# Patient Record
Sex: FEMALE | Race: OTHER | Employment: PART TIME | URBAN - METROPOLITAN AREA
[De-identification: names, ages, dates, MRNs, and addresses within clinical notes are randomized per-mention and may not be internally consistent; named-entity substitution may affect disease eponyms.]

---

## 2024-04-05 ENCOUNTER — APPOINTMENT (OUTPATIENT)
Dept: GENERAL RADIOLOGY | Age: 59
End: 2024-04-05
Attending: EMERGENCY MEDICINE
Payer: COMMERCIAL

## 2024-04-05 ENCOUNTER — HOSPITAL ENCOUNTER (OUTPATIENT)
Age: 59
Discharge: HOME | End: 2024-04-05
Attending: EMERGENCY MEDICINE
Payer: COMMERCIAL

## 2024-04-05 VITALS
HEIGHT: 61 IN | HEART RATE: 82 BPM | WEIGHT: 177 LBS | OXYGEN SATURATION: 95 % | RESPIRATION RATE: 20 BRPM | DIASTOLIC BLOOD PRESSURE: 87 MMHG | SYSTOLIC BLOOD PRESSURE: 135 MMHG | BODY MASS INDEX: 33.42 KG/M2 | TEMPERATURE: 98 F

## 2024-04-05 DIAGNOSIS — R05.2 SUBACUTE COUGH: Primary | ICD-10-CM

## 2024-04-05 LAB — SARS-COV-2 RNA RESP QL NAA+PROBE: NOT DETECTED

## 2024-04-05 PROCEDURE — 99203 OFFICE O/P NEW LOW 30 MIN: CPT

## 2024-04-05 PROCEDURE — 99204 OFFICE O/P NEW MOD 45 MIN: CPT

## 2024-04-05 PROCEDURE — 71046 X-RAY EXAM CHEST 2 VIEWS: CPT | Performed by: EMERGENCY MEDICINE

## 2024-04-05 RX ORDER — PREDNISONE 20 MG/1
60 TABLET ORAL DAILY
Qty: 15 TABLET | Refills: 0 | Status: SHIPPED | OUTPATIENT
Start: 2024-04-05 | End: 2024-04-10

## 2024-04-05 RX ORDER — ALBUTEROL SULFATE 90 UG/1
2 AEROSOL, METERED RESPIRATORY (INHALATION) EVERY 4 HOURS PRN
Qty: 1 EACH | Refills: 0 | Status: SHIPPED | OUTPATIENT
Start: 2024-04-05 | End: 2024-05-05

## 2024-04-05 RX ORDER — BENZONATATE 100 MG/1
100 CAPSULE ORAL 3 TIMES DAILY PRN
Qty: 30 CAPSULE | Refills: 0 | Status: SHIPPED | OUTPATIENT
Start: 2024-04-05 | End: 2024-05-05

## 2024-04-05 RX ORDER — AZITHROMYCIN 250 MG/1
TABLET, FILM COATED ORAL
Qty: 6 TABLET | Refills: 0 | Status: SHIPPED | OUTPATIENT
Start: 2024-04-05 | End: 2024-04-10

## 2024-04-05 RX ORDER — AMLODIPINE BESYLATE 10 MG/1
10 TABLET ORAL DAILY
COMMUNITY

## 2024-04-05 NOTE — ED INITIAL ASSESSMENT (HPI)
States she has a chronic cough but for 1 month, the cough is getting worse. Had a cold 1 week ago. Otc meds not helping. Denies fevers.

## 2024-04-05 NOTE — ED PROVIDER NOTES
Patient Seen in: [facility]      History     Chief Complaint   Patient presents with    Cough/URI     Stated Complaint: cough    Subjective:   HPI    Patient is a 59-year-old female with a history of a chronic cough.  Says that she has had multiple workups and seen pulmonology without clear etiology.  She presents with a cough that is worse over the last month.  Had congestion and runny nose a week ago.  The cough has been persistent not responding to NyQuil or over-the-counter cold medications.  Coughing up a small amount of sputum.  No chest pain.  No other specific complaints.  Denies history of asthma.  Patient is otherwise at her medical baseline.    Objective:   Past Medical History:   Diagnosis Date    Essential hypertension               History reviewed. No pertinent surgical history.             Social History     Socioeconomic History    Marital status:    Tobacco Use    Smoking status: Never     Passive exposure: Never   Vaping Use    Vaping Use: Never used              Review of Systems    Positive for stated complaint: cough  Other systems are as noted in HPI.  Constitutional and vital signs reviewed.      All other systems reviewed and negative except as noted above.    Physical Exam     ED Triage Vitals [04/05/24 0927]   /87   Pulse 82   Resp 20   Temp 98.3 °F (36.8 °C)   Temp src Temporal   SpO2 95 %   O2 Device None (Room air)       Current:/87   Pulse 82   Temp 98.3 °F (36.8 °C) (Temporal)   Resp 20   Ht 154.9 cm (5' 1\")   Wt 80.3 kg   SpO2 95%   BMI 33.44 kg/m²         Physical Exam    General: Patient is resting comfortably in no acute distress  HEENT: Normal cephalic atraumatic.  Nonicteric sclera.  Moist mucous membranes.  No meningismus.  No adenopathy  Lungs: No tachypnea.  Lungs clear to auscultation bilaterally without rales/rhonchi.  Equal breath sounds bilaterally  Cardiac: No tachycardia.  No murmurs.  Regular rate and rhythm.  Abdomen: Soft  and nontender throughout.  No rebound or guarding  Extremities: No clubbing/cyanosis/edema.  Skin: No rashes, no pallor  Neuro: Awake oriented ×3.  Nonfocal.  Good strength throughout    ED Course     Labs Reviewed   RAPID SARS-COV-2 BY PCR - Normal             Chest x-ray: I personally reviewed the films and my independent interpertaion showed normal.  No pneumonia.  Official report reviewed.  Normal.  No pneumonia.     COVID-negative    MDM      Patient is a 59-year-old woman with a history of a chronic cough with worsening symptoms over the last month particular over the last week.  Differential includes secondary pneumonia, cough.  Asthma, nasal drip, other.  Discussed with patient, will check chest x-ray.  Would consider course of steroids, albuterol to see if it helps.  Given her worsening symptoms over the last months we will treat for clinical pneumonia, bacterial bronchitis with a Z-Mickey.  Will follow-up with primary care.  Suggest pulmonology if not improving.                                   Medical Decision Making      Disposition and Plan     Clinical Impression:  1. Subacute cough         Disposition:  Discharge  4/5/2024 10:07 am    Follow-up:  Mirna Plata DO  [id number]  [facility] [address]  [facility] [id number] [phone]    In 1 week      Kate Ortiz MD  [address]  [facility] [address]  [facility] [id number] [phone]    In 1 week            Medications Prescribed:  Discharge Medication List as of 4/5/2024 10:08 AM        START taking these medications    Details   predniSONE 20 MG Oral Tab Take 3 tablets (60 mg total) by mouth daily for 5 days., Print, Disp-15 tablet, R-0      azithromycin (ZITHROMAX Z-MICKEY) 250 MG Oral Tab 500 mg once followed by 250 mg daily x 4 days, Print, Disp-6 tablet, R-0      albuterol 108 (90 Base) MCG/ACT Inhalation Aero Soln Inhale 2 puffs into the lungs every 4 (four) hours as needed for Wheezing., Print, Disp-1 each, R-0      benzonatate 100 MG Oral Cap Take 1 capsule  (100 mg total) by mouth 3 (three) times daily as needed for cough., Print, Disp-30 capsule, R-0

## 2024-04-05 NOTE — DISCHARGE INSTRUCTIONS
Will treat for possible bacterial bronchitis given the length your cough.  Will treat with Z-Mickey.  Prednisone, albuterol.  Can take Tessalon Perles.  Will follow-up with primary care.  Consider pulmonology.  Return if worsening symptoms or new complaints.

## 2024-05-07 ENCOUNTER — OFFICE VISIT (OUTPATIENT)
Dept: INTERNAL MEDICINE CLINIC | Facility: CLINIC | Age: 59
End: 2024-05-07
Payer: COMMERCIAL

## 2024-05-07 VITALS
OXYGEN SATURATION: 93 % | BODY MASS INDEX: 33 KG/M2 | SYSTOLIC BLOOD PRESSURE: 130 MMHG | RESPIRATION RATE: 15 BRPM | TEMPERATURE: 98 F | WEIGHT: 173.63 LBS | DIASTOLIC BLOOD PRESSURE: 80 MMHG | HEART RATE: 74 BPM

## 2024-05-07 DIAGNOSIS — I10 HYPERTENSION, UNSPECIFIED TYPE: ICD-10-CM

## 2024-05-07 DIAGNOSIS — Z01.89 ROUTINE LAB DRAW: ICD-10-CM

## 2024-05-07 DIAGNOSIS — R05.3 CHRONIC COUGH: ICD-10-CM

## 2024-05-07 DIAGNOSIS — Z12.11 COLON CANCER SCREENING: ICD-10-CM

## 2024-05-07 DIAGNOSIS — J98.9 CHRONIC AIRWAY DISEASE: Primary | ICD-10-CM

## 2024-05-07 PROCEDURE — 3075F SYST BP GE 130 - 139MM HG: CPT | Performed by: INTERNAL MEDICINE

## 2024-05-07 PROCEDURE — 99204 OFFICE O/P NEW MOD 45 MIN: CPT | Performed by: INTERNAL MEDICINE

## 2024-05-07 PROCEDURE — 3079F DIAST BP 80-89 MM HG: CPT | Performed by: INTERNAL MEDICINE

## 2024-05-07 RX ORDER — ALBUTEROL SULFATE 90 UG/1
2 AEROSOL, METERED RESPIRATORY (INHALATION) EVERY 4 HOURS PRN
Qty: 3 EACH | Refills: 1 | Status: SHIPPED | OUTPATIENT
Start: 2024-05-07

## 2024-05-07 RX ORDER — MONTELUKAST SODIUM 10 MG/1
10 TABLET ORAL NIGHTLY
Qty: 90 TABLET | Refills: 0 | Status: SHIPPED | OUTPATIENT
Start: 2024-05-07

## 2024-05-07 NOTE — H&P
Subjective:   Raiza Carranza is a 59 year old female  who presents for Cough       Went to  on 4/5/24 for chronic cough. Per review she has had work-up; including seeing pulm.  Had recent exacerbation so went to . She was treated to cover for PNA and also given steroid course.  She was also given referral for pulm.      Reports that she had PFTs that were neg in 2011 and last year. States she has had several.   However she states that albuterol helped in the past.   Does not recall CT chest but has had cxrs.   No history of smoking.   Has worked in factories.   Parents did smoke.   Pt states that since ~2004 she has had chronic cough that is worse with outside environments and cold.     Pt does reports allergies. States that OTC allergy  medication wo improvement. Has not tried montelukast.     HTN-controlled     States she had mammogram ~ 7 months at [facility].  Due for colon cancer screening. Denies family hx of colon cancer. Pt would rather do FIT  History/Other:    Chief Complaint Reviewed and Verified  No Further Nursing Notes to   Review  Tobacco Reviewed  Allergies Reviewed  Medications Reviewed    Medical History Reviewed  Surgical History Reviewed  OB Status Reviewed    Family History Reviewed  Social History Reviewed         Current Outpatient Medications   Medication Sig Dispense Refill    amLODIPine 10 MG Oral Tab Take 1 tablet (10 mg total) by mouth daily.         Review of Systems:  Pertinent items are noted in HPI.  A comprehensive 10 point review of systems was completed.  Pertinent positives and negatives noted in the the HPI.        Objective:   /80 (BP Location: Right arm, Patient Position: Sitting, Cuff Size: adult)   Pulse 74   Temp 98.3 °F (36.8 °C) (Temporal)   Resp 15   Wt 173 lb 9.6 oz (78.7 kg)   LMP  (LMP Unknown)   SpO2 93%   BMI 32.80 kg/m²  Estimated body mass index is 32.8 kg/m² as calculated from the following:    Height as of 4/5/24: 5' 1\" (1.549  Detail Level: Zone m).    Weight as of this encounter: 173 lb 9.6 oz (78.7 kg).  PHYSICAL EXAM:   General: no acute distress   Respiratory: no increased work of breathing; good air exchange; CTAB; no crackles or wheezing   Cardiovascular: RRR; S1, S2; no murmurs  Neurological: awake, alert, oriented x3; CNII-XII grossly intact;  Behavioral/Psych: euthymic; appropriate affect       Assessment & Plan:   Raiza was seen today for cough.    Diagnoses and all orders for this visit:  Chronic cough  Chronic airway disease  -     CT CHEST (CPT=71250); Future  -     albuterol 108 (90 Base) MCG/ACT Inhalation Aero Soln; Inhale 2 puffs into the lungs every 4 (four) hours as needed for Wheezing or Shortness of Breath.  -     montelukast 10 MG Oral Tab; Take 1 tablet (10 mg total) by mouth nightly.  -zyrtec trial  -     Pulmonary Referral - In Network      Routine lab draw  -     Vitamin D; Future  -     CBC With Differential With Platelet; Future  -     Comp Metabolic Panel (14); Future  -     Hemoglobin A1C; Future  -     TSH W Reflex To Free T4; Future  -     Lipid Panel; Future    Colon cancer screening  -     Occult Blood, Fecal, Immunoassay (Blue cards) [E]; Future    HTN-CPM and monitor           Alana Cade MD   Detail Level: Detailed

## 2024-05-15 ENCOUNTER — LAB ENCOUNTER (OUTPATIENT)
Dept: LAB | Age: 59
End: 2024-05-15
Attending: INTERNAL MEDICINE

## 2024-05-15 DIAGNOSIS — Z01.89 ROUTINE LAB DRAW: ICD-10-CM

## 2024-05-15 LAB
ALBUMIN SERPL-MCNC: 4.6 G/DL (ref 3.2–4.8)
ALBUMIN/GLOB SERPL: 1.5 {RATIO} (ref 1–2)
ALP LIVER SERPL-CCNC: 79 U/L
ALT SERPL-CCNC: 21 U/L
ANION GAP SERPL CALC-SCNC: 7 MMOL/L (ref 0–18)
AST SERPL-CCNC: 23 U/L (ref ?–34)
BASOPHILS # BLD AUTO: 0.08 X10(3) UL (ref 0–0.2)
BASOPHILS NFR BLD AUTO: 1.4 %
BILIRUB SERPL-MCNC: 0.6 MG/DL (ref 0.3–1.2)
BUN BLD-MCNC: 15 MG/DL (ref 9–23)
BUN/CREAT SERPL: 23.4 (ref 10–20)
CALCIUM BLD-MCNC: 9.6 MG/DL (ref 8.7–10.4)
CHLORIDE SERPL-SCNC: 107 MMOL/L (ref 98–112)
CHOLEST SERPL-MCNC: 201 MG/DL (ref ?–200)
CO2 SERPL-SCNC: 26 MMOL/L (ref 21–32)
CREAT BLD-MCNC: 0.64 MG/DL
DEPRECATED RDW RBC AUTO: 47.9 FL (ref 35.1–46.3)
EGFRCR SERPLBLD CKD-EPI 2021: 103 ML/MIN/1.73M2 (ref 60–?)
EOSINOPHIL # BLD AUTO: 0.09 X10(3) UL (ref 0–0.7)
EOSINOPHIL NFR BLD AUTO: 1.5 %
ERYTHROCYTE [DISTWIDTH] IN BLOOD BY AUTOMATED COUNT: 14.4 % (ref 11–15)
EST. AVERAGE GLUCOSE BLD GHB EST-MCNC: 117 MG/DL (ref 68–126)
FASTING PATIENT LIPID ANSWER: YES
FASTING STATUS PATIENT QL REPORTED: YES
GLOBULIN PLAS-MCNC: 3 G/DL (ref 2–3.5)
GLUCOSE BLD-MCNC: 86 MG/DL (ref 70–99)
HBA1C MFR BLD: 5.7 % (ref ?–5.7)
HCT VFR BLD AUTO: 46.4 %
HDLC SERPL-MCNC: 51 MG/DL (ref 40–59)
HGB BLD-MCNC: 15.1 G/DL
IMM GRANULOCYTES # BLD AUTO: 0.01 X10(3) UL (ref 0–1)
IMM GRANULOCYTES NFR BLD: 0.2 %
LDLC SERPL CALC-MCNC: 140 MG/DL (ref ?–100)
LYMPHOCYTES # BLD AUTO: 1.88 X10(3) UL (ref 1–4)
LYMPHOCYTES NFR BLD AUTO: 32 %
MCH RBC QN AUTO: 29.3 PG (ref 26–34)
MCHC RBC AUTO-ENTMCNC: 32.5 G/DL (ref 31–37)
MCV RBC AUTO: 89.9 FL
MONOCYTES # BLD AUTO: 0.47 X10(3) UL (ref 0.1–1)
MONOCYTES NFR BLD AUTO: 8 %
NEUTROPHILS # BLD AUTO: 3.34 X10 (3) UL (ref 1.5–7.7)
NEUTROPHILS # BLD AUTO: 3.34 X10(3) UL (ref 1.5–7.7)
NEUTROPHILS NFR BLD AUTO: 56.9 %
NONHDLC SERPL-MCNC: 150 MG/DL (ref ?–130)
OSMOLALITY SERPL CALC.SUM OF ELEC: 290 MOSM/KG (ref 275–295)
PLATELET # BLD AUTO: 269 10(3)UL (ref 150–450)
POTASSIUM SERPL-SCNC: 3.8 MMOL/L (ref 3.5–5.1)
PROT SERPL-MCNC: 7.6 G/DL (ref 5.7–8.2)
RBC # BLD AUTO: 5.16 X10(6)UL
SODIUM SERPL-SCNC: 140 MMOL/L (ref 136–145)
TRIGL SERPL-MCNC: 54 MG/DL (ref 30–149)
TSI SER-ACNC: 3.57 MIU/ML (ref 0.55–4.78)
VIT D+METAB SERPL-MCNC: 21 NG/ML (ref 30–100)
VLDLC SERPL CALC-MCNC: 10 MG/DL (ref 0–30)
WBC # BLD AUTO: 5.9 X10(3) UL (ref 4–11)

## 2024-05-15 PROCEDURE — 80050 GENERAL HEALTH PANEL: CPT | Performed by: INTERNAL MEDICINE

## 2024-05-15 PROCEDURE — 82306 VITAMIN D 25 HYDROXY: CPT | Performed by: INTERNAL MEDICINE

## 2024-05-15 PROCEDURE — 83036 HEMOGLOBIN GLYCOSYLATED A1C: CPT | Performed by: INTERNAL MEDICINE

## 2024-05-15 PROCEDURE — 80061 LIPID PANEL: CPT | Performed by: INTERNAL MEDICINE

## 2024-05-28 ENCOUNTER — LAB ENCOUNTER (OUTPATIENT)
Dept: LAB | Age: 59
End: 2024-05-28
Attending: INTERNAL MEDICINE

## 2024-05-28 ENCOUNTER — TELEPHONE (OUTPATIENT)
Dept: INTERNAL MEDICINE CLINIC | Facility: CLINIC | Age: 59
End: 2024-05-28

## 2024-05-28 DIAGNOSIS — Z12.11 COLON CANCER SCREENING: ICD-10-CM

## 2024-05-28 LAB — HEMOCCULT STL QL: POSITIVE

## 2024-05-28 PROCEDURE — 82274 ASSAY TEST FOR BLOOD FECAL: CPT | Performed by: INTERNAL MEDICINE

## 2024-05-28 NOTE — TELEPHONE ENCOUNTER
Patient called the office stating that her insurance is requiring pre-authorization for her Chest CT. Patient also stated that she would like a call back once this is approved so she may schedule. Call back number is [phone]. Please advise.

## 2024-05-30 NOTE — TELEPHONE ENCOUNTER
LMTCB x 1 for patient to inform can call and schedule appt now.     Type Date User Summary Attachment   Prior Authorization Confirmed/Denied/NA 05/29/2024  1:46 PM Sonia Bosch Auth -   Note:  CT CHEST (CPT=71250)         IB message received, requesting followup      [employer] online, the following request is authorized     Authorization: [id number]   Valid: 5/29/2024 - 6/28/2024

## 2024-06-19 ENCOUNTER — OFFICE VISIT (OUTPATIENT)
Dept: INTERNAL MEDICINE CLINIC | Facility: CLINIC | Age: 59
End: 2024-06-19

## 2024-06-19 VITALS
BODY MASS INDEX: 32 KG/M2 | TEMPERATURE: 98 F | OXYGEN SATURATION: 98 % | RESPIRATION RATE: 15 BRPM | DIASTOLIC BLOOD PRESSURE: 68 MMHG | HEART RATE: 70 BPM | WEIGHT: 171.38 LBS | SYSTOLIC BLOOD PRESSURE: 120 MMHG

## 2024-06-19 DIAGNOSIS — I10 HYPERTENSION, UNSPECIFIED TYPE: ICD-10-CM

## 2024-06-19 DIAGNOSIS — J98.9 CHRONIC AIRWAY DISEASE: Primary | ICD-10-CM

## 2024-06-19 DIAGNOSIS — E78.5 HYPERLIPIDEMIA, UNSPECIFIED HYPERLIPIDEMIA TYPE: ICD-10-CM

## 2024-06-19 DIAGNOSIS — R19.5 POSITIVE FIT (FECAL IMMUNOCHEMICAL TEST): ICD-10-CM

## 2024-06-19 DIAGNOSIS — R73.03 PREDIABETES: ICD-10-CM

## 2024-06-19 DIAGNOSIS — J30.9 ALLERGIC RHINITIS, UNSPECIFIED SEASONALITY, UNSPECIFIED TRIGGER: ICD-10-CM

## 2024-06-19 DIAGNOSIS — R79.89 LOW VITAMIN D LEVEL: ICD-10-CM

## 2024-06-19 DIAGNOSIS — R05.3 CHRONIC COUGH: ICD-10-CM

## 2024-06-19 PROCEDURE — G2211 COMPLEX E/M VISIT ADD ON: HCPCS | Performed by: INTERNAL MEDICINE

## 2024-06-19 PROCEDURE — 3078F DIAST BP <80 MM HG: CPT | Performed by: INTERNAL MEDICINE

## 2024-06-19 PROCEDURE — 99214 OFFICE O/P EST MOD 30 MIN: CPT | Performed by: INTERNAL MEDICINE

## 2024-06-19 PROCEDURE — 3074F SYST BP LT 130 MM HG: CPT | Performed by: INTERNAL MEDICINE

## 2024-06-19 RX ORDER — AMLODIPINE BESYLATE 10 MG/1
10 TABLET ORAL DAILY
Qty: 90 TABLET | Refills: 3 | Status: SHIPPED | OUTPATIENT
Start: 2024-06-19

## 2024-06-19 RX ORDER — MONTELUKAST SODIUM 10 MG/1
10 TABLET ORAL NIGHTLY
Qty: 90 TABLET | Refills: 3 | Status: SHIPPED | OUTPATIENT
Start: 2024-06-19

## 2024-06-19 NOTE — PROGRESS NOTES
Subjective:   Raiza Castro is a 59 year old female  who presents for Follow - Up       Chronic airway disease/chronic cough/rhinitis- started on montelukast and albuterol and zyrtec. Reports resolution of all symptoms since starting montelukast.     HTN-controlled. Denies cp/sob    preDM/HLD- diet/exercise and follow-up labs.     Low vitamin d-supplements.     Positive FIT-provided referral for GI and GI office called to schedule, but she declined.   Pt denied symptoms.    Discussed with patient the reasoning behind recommending cscope with positive FIT. Also discussed risks/benefits. Voiced understanding.     History/Other:    Chief Complaint Reviewed and Verified  No Further Nursing Notes to   Review  Allergies Reviewed  Medications Reviewed  OB Status Reviewed         Current Outpatient Medications   Medication Sig Dispense Refill    albuterol 108 (90 Base) MCG/ACT Inhalation Aero Soln Inhale 2 puffs into the lungs every 4 (four) hours as needed for Wheezing or Shortness of Breath. 3 each 1    montelukast 10 MG Oral Tab Take 1 tablet (10 mg total) by mouth nightly. 90 tablet 0    amLODIPine 10 MG Oral Tab Take 1 tablet (10 mg total) by mouth daily.         Review of Systems:  Pertinent items are noted in HPI.  A comprehensive 10 point review of systems was completed.  Pertinent positives and negatives noted in the the HPI.        Objective:   /68 (BP Location: Left arm, Patient Position: Sitting, Cuff Size: adult)   Pulse 70   Temp 98.3 °F (36.8 °C) (Temporal)   Resp 15   Wt 171 lb 6.4 oz (77.7 kg)   LMP  (LMP Unknown)   SpO2 98%   BMI 32.39 kg/m²  Estimated body mass index is 32.39 kg/m² as calculated from the following:    Height as of 4/5/24: 5' 1\" (1.549 m).    Weight as of this encounter: 171 lb 6.4 oz (77.7 kg).  PHYSICAL EXAM:   General: no acute distress   Respiratory: no increased work of breathing; good air exchange; CTAB; no crackles or wheezing   Cardiovascular: RRR; S1,  S2; no murmurs;   Gastrointestinal: normal bowel sounds; soft; non-distended; non-tender  Neurological: awake, alert, oriented x3; CNII-XII grossly intact;  Behavioral/Psych: euthymic; appropriate affect       Assessment & Plan:   Raiza was seen today for follow - up.    Diagnoses and all orders for this visit:    Positive FIT (fecal immunochemical test)  Pt previously declined GI appt/cscope.  Discussed with patient the reasoning behind recommending cscope with positive FIT. Also discussed risks/benefits. Voiced understanding.   Printed out referral for GI and recommended she schedule.     Hypertension, unspecified type  -continue amlodipine     Chronic cough-resolved with med  Chronic airway disease  Allergic rhinitis, unspecified seasonality, unspecified trigger  -     montelukast 10 MG Oral Tab; Take 1 tablet (10 mg total) by mouth nightly.      Prediabetes  -   diet/exercise; follow-up labs  Hemoglobin A1C [E]; Future 12/2024    Hyperlipidemia, unspecified hyperlipidemia type  -  diet/exercise; follow-up labs    Lipid Panel [E]; Future    Low vitamin D level  -   supplements and follow-up labs   Vitamin D [E]; Future                    Alana Cade MD

## 2024-08-23 ENCOUNTER — TELEPHONE (OUTPATIENT)
Dept: INTERNAL MEDICINE CLINIC | Facility: CLINIC | Age: 59
End: 2024-08-23

## 2024-08-23 NOTE — TELEPHONE ENCOUNTER
Provided patient with,    Yuri Puente   [facility]  [address] [place] [address] [place] [id number] [phone]

## 2024-09-23 ENCOUNTER — OFFICE VISIT (OUTPATIENT)
Dept: OBGYN CLINIC | Facility: CLINIC | Age: 59
End: 2024-09-23
Payer: COMMERCIAL

## 2024-09-23 VITALS
WEIGHT: 178 LBS | BODY MASS INDEX: 32.76 KG/M2 | SYSTOLIC BLOOD PRESSURE: 110 MMHG | DIASTOLIC BLOOD PRESSURE: 78 MMHG | HEIGHT: 62 IN | HEART RATE: 90 BPM

## 2024-09-23 DIAGNOSIS — N63.11 MASS OF UPPER OUTER QUADRANT OF RIGHT BREAST: ICD-10-CM

## 2024-09-23 DIAGNOSIS — Z12.4 CERVICAL CANCER SCREENING: Primary | ICD-10-CM

## 2024-09-23 DIAGNOSIS — N81.10 PROLAPSE OF VAGINAL WALL: ICD-10-CM

## 2024-09-23 DIAGNOSIS — Z12.31 ENCOUNTER FOR SCREENING MAMMOGRAM FOR MALIGNANT NEOPLASM OF BREAST: ICD-10-CM

## 2024-09-23 PROCEDURE — 87624 HPV HI-RISK TYP POOLED RSLT: CPT | Performed by: STUDENT IN AN ORGANIZED HEALTH CARE EDUCATION/TRAINING PROGRAM

## 2024-09-23 RX ORDER — FLUTICASONE PROPIONATE 50 MCG
1 SPRAY, SUSPENSION (ML) NASAL DAILY
COMMUNITY

## 2024-09-23 RX ORDER — FLUTICASONE FUROATE AND VILANTEROL 200; 25 UG/1; UG/1
1 POWDER RESPIRATORY (INHALATION) DAILY
COMMUNITY
Start: 2024-09-13

## 2024-09-23 NOTE — PROGRESS NOTES
Annual Exam (Ages 50-64)    Subjective:    This is a 59 year old  presenting for routine annual exam    Previously seen at [facility] and told she had ovarian cysts. She would like to f/u on this. Also has a vaginal bulge that comes out with prolonged standing. Had a right axillary tail mass for many years and would like to f/u on this.       Review of Systems   Constitutional: Negative.    HENT: Negative.    Respiratory: Negative.    Gastrointestinal: Negative.    Endocrine: Negative.    Genitourinary: Negative.    Musculoskeletal: Negative.    Skin: Negative.    Allergic/Immunologic: Negative.    Neurological: Negative.      Objective:    No Known Allergies  Past Medical History:    Chronic cough    Essential hypertension    Hypertension       Current Outpatient Medications:     fluticasone propionate 50 MCG/ACT Nasal Suspension, 1 spray by Each Nare route daily., Disp: , Rfl:     fluticasone furoate-vilanterol 200-25 MCG/ACT Inhalation Aerosol Powder, Breath Activated, Inhale 1 puff into the lungs daily., Disp: , Rfl:     amLODIPine 10 MG Oral Tab, Take 1 tablet (10 mg total) by mouth daily., Disp: 90 tablet, Rfl: 3    montelukast 10 MG Oral Tab, Take 1 tablet (10 mg total) by mouth nightly., Disp: 90 tablet, Rfl: 3    albuterol 108 (90 Base) MCG/ACT Inhalation Aero Soln, Inhale 2 puffs into the lungs every 4 (four) hours as needed for Wheezing or Shortness of Breath., Disp: 3 each, Rfl: 1  History reviewed. No pertinent surgical history.  Family History   Problem Relation Age of Onset    Prostate Cancer Father 81    Hypertension Mother     Diabetes Mother     Blood Cancer Brother 61      reports that she has never smoked. She has never been exposed to tobacco smoke. She does not have any smokeless tobacco history on file. She reports that she does not drink alcohol and does not use drugs.    Physical Exam     Vitals:    24 1606   BP: 110/78   Pulse: 90        Constitutional: She is oriented to  person, place, and time. She appears well-developed and well-nourished.   Cardiovascular: normal peripheral perfusion  Breasts: Examined sitting and supine. No cervical, supraclavicular adenopathy. Right axillary tail mass. No breast masses  Pulmonary/Chest: non labored breathing  Abdominal: Soft  Genitourinary: Normal appearing external genitalia. Vagina is well estrogenized. Normal appearing urethral meatus. Bartholin's gland normal to palpation. Normal appearing  cervix. Cervix is not friable and with normal appearing discharge. Uterus is 10 weeks size  and non tender. No cervical motion tenderness. Normal adnexa bilaterally without tenderness.  +posterior wall bulging to +1, anterior wall to 0, no/minimal apical prolapse  Neurological: She is alert and oriented to person, place, and time.     Assessment and Plan    This is a 59 year old  presenting for annual exam.     #Annual Exam  -Depression screening   Depression Screening (PHQ-2/PHQ-9): Over the LAST 2 WEEKS   Little interest or pleasure in doing things (over the last two weeks)?: Not at all    Feeling down, depressed, or hopeless (over the last two weeks)?: Not at all    PHQ-2 SCORE: 0        -Blood pressure screening normal  -Contraception menopause  -Body mass index is 32.56 kg/m².   -Pap smear collected  -Mammogram and breast ultrasound ordered for axillary mass  -service to pelvic floor PT for prolapse      Yuri Puente MD

## 2024-09-23 NOTE — PATIENT INSTRUCTIONS
[place]:    [facility] [place]             [address]                      [address]                                [place] [address]               [phone]  Urogynecologist    [facility] Central [phone].     [facility]  [address], [facility] [address]  [place] [address]  [phone]    [facility]  [address]  [place] [address]  [phone]    [facility]   [facility] [address]  [place] [address]  [phone]

## 2024-09-24 LAB — HPV E6+E7 MRNA CVX QL NAA+PROBE: NEGATIVE

## 2024-09-27 ENCOUNTER — HOSPITAL ENCOUNTER (OUTPATIENT)
Dept: MAMMOGRAPHY | Age: 59
Discharge: HOME | End: 2024-09-27
Attending: STUDENT IN AN ORGANIZED HEALTH CARE EDUCATION/TRAINING PROGRAM
Payer: COMMERCIAL

## 2024-09-27 ENCOUNTER — TELEPHONE (OUTPATIENT)
Dept: OBGYN CLINIC | Facility: CLINIC | Age: 59
End: 2024-09-27

## 2024-09-27 DIAGNOSIS — Z12.31 ENCOUNTER FOR SCREENING MAMMOGRAM FOR MALIGNANT NEOPLASM OF BREAST: ICD-10-CM

## 2024-09-27 DIAGNOSIS — N63.0 MASS OF BREAST, UNSPECIFIED LATERALITY: Primary | ICD-10-CM

## 2024-09-27 NOTE — TELEPHONE ENCOUNTER
Patient states provider placed order for mammogram however recommenced mammogram and breast ultrasound. Patient would like to do both at the same time and per imaging center we would have to place an order for a diagnostic mammogram. Thank you

## 2024-09-27 NOTE — TELEPHONE ENCOUNTER
Please read message below , have pended order for a diagnostic mammogram if you would please sign for pt. If ok with you ..thanks

## 2024-09-28 ENCOUNTER — HOSPITAL ENCOUNTER (OUTPATIENT)
Dept: CT IMAGING | Age: 59
Discharge: HOME | End: 2024-09-28
Attending: INTERNAL MEDICINE
Payer: COMMERCIAL

## 2024-09-28 DIAGNOSIS — R05.3 CHRONIC COUGH: ICD-10-CM

## 2024-09-28 DIAGNOSIS — J98.9 CHRONIC AIRWAY DISEASE: ICD-10-CM

## 2024-09-28 LAB
.: NORMAL
.: NORMAL

## 2024-09-28 PROCEDURE — 71250 CT THORAX DX C-: CPT | Performed by: INTERNAL MEDICINE

## 2024-10-03 ENCOUNTER — ULTRASOUND ENCOUNTER (OUTPATIENT)
Dept: OBGYN CLINIC | Facility: CLINIC | Age: 59
End: 2024-10-03
Payer: COMMERCIAL

## 2024-10-08 ENCOUNTER — TELEPHONE (OUTPATIENT)
Facility: CLINIC | Age: 59
End: 2024-10-08

## 2024-10-08 NOTE — TELEPHONE ENCOUNTER
Received referral to lung nodule clinic from Dr. Annabel Cade.  CT chest performed on 9-28-24 with findings:  Noncalcified pulmonary nodules in the lungs measuring up to 5 mm as above. Followup as per Fleischner criteria is suggested.   Call placed to pt and left message to call back.

## 2024-10-09 NOTE — TELEPHONE ENCOUNTER
Pt returned call and PCP wants pt to see Dr. Richmond.  Pt requesting appointment to be scheduled.  Message forwarded to [place] PSR to schedule appointment as schedules allow.  Pt will be available tomorrow.

## 2024-10-09 NOTE — TELEPHONE ENCOUNTER
Per  PSR, pt already sees Dr. Madden.  She will be calling her PCP  to see why referral was to Dr. Richmond.

## 2024-10-23 ENCOUNTER — OFFICE VISIT (OUTPATIENT)
Age: 59
End: 2024-10-23
Payer: COMMERCIAL

## 2024-10-23 VITALS
HEIGHT: 62 IN | SYSTOLIC BLOOD PRESSURE: 124 MMHG | RESPIRATION RATE: 6 BRPM | HEART RATE: 68 BPM | OXYGEN SATURATION: 97 % | BODY MASS INDEX: 32.76 KG/M2 | WEIGHT: 178 LBS | DIASTOLIC BLOOD PRESSURE: 84 MMHG

## 2024-10-23 DIAGNOSIS — R05.3 CHRONIC COUGH: ICD-10-CM

## 2024-10-23 DIAGNOSIS — R91.8 MULTIPLE LUNG NODULES: Primary | ICD-10-CM

## 2024-10-23 PROCEDURE — 3074F SYST BP LT 130 MM HG: CPT | Performed by: INTERNAL MEDICINE

## 2024-10-23 PROCEDURE — 3008F BODY MASS INDEX DOCD: CPT | Performed by: INTERNAL MEDICINE

## 2024-10-23 PROCEDURE — 3079F DIAST BP 80-89 MM HG: CPT | Performed by: INTERNAL MEDICINE

## 2024-10-23 PROCEDURE — 99204 OFFICE O/P NEW MOD 45 MIN: CPT | Performed by: INTERNAL MEDICINE

## 2024-10-23 RX ORDER — IPRATROPIUM BROMIDE 42 UG/1
1 SPRAY, METERED NASAL NIGHTLY
Qty: 1 EACH | Refills: 0 | Status: SHIPPED | OUTPATIENT
Start: 2024-10-23

## 2024-10-23 NOTE — PROGRESS NOTES
[facility] General Pulmonary Consult Note    Chief Complaint:  Chief Complaint   Patient presents with    New Patient     Lung nodule CT 9/28 pt c/o cough x20 years       History of Present Illness:  Raiza Castro is a 59 year old female who presents today for evaluation of lung nodule as well as chronic cough for the past 20 years.  Patient is a never smoker.  Has no history of lung disease or lung cancer in her family.  Patient has had a mostly dry cough with occasional mucus.  Does endorse significant postnasal drip.  Mostly worse when it is cold, but has allergies as well.      Past Medical History:   Past Medical History:    Chronic cough    Essential hypertension    Hypertension        Past Surgical History: History reviewed. No pertinent surgical history.    Family Medical History:   Family History   Problem Relation Age of Onset    Prostate Cancer Father 81    Hypertension Mother     Diabetes Mother     Blood Cancer Brother 61        Social History:   Social History     Socioeconomic History    Marital status:      Spouse name: Not on file    Number of children: Not on file    Years of education: Not on file    Highest education level: Not on file   Occupational History    Not on file   Tobacco Use    Smoking status: Never     Passive exposure: Past (parents)    Smokeless tobacco: Not on file   Vaping Use    Vaping status: Never Used   Substance and Sexual Activity    Alcohol use: Never    Drug use: Never    Sexual activity: Not on file   Other Topics Concern    Not on file   Social History Narrative    Not on file     Social Drivers of Health     Financial Resource Strain: Not on file   Food Insecurity: Not on file   Transportation Needs: Not on file   Physical Activity: Not on file   Stress: Not on file   Social Connections: Not on file   Housing Stability: Not on file        Allergies: Patient has no known allergies.     Medications:   Current Outpatient Medications   Medication Sig Dispense Refill     ipratropium 0.06 % Nasal Solution 1 spray by Nasal route nightly. 1 sprays in each nostril nightly 1 each 0    fluticasone propionate 50 MCG/ACT Nasal Suspension 1 spray by Each Nare route daily.      amLODIPine 10 MG Oral Tab Take 1 tablet (10 mg total) by mouth daily. 90 tablet 3    montelukast 10 MG Oral Tab Take 1 tablet (10 mg total) by mouth nightly. 90 tablet 3    albuterol 108 (90 Base) MCG/ACT Inhalation Aero Soln Inhale 2 puffs into the lungs every 4 (four) hours as needed for Wheezing or Shortness of Breath. 3 each 1    fluticasone furoate-vilanterol 200-25 MCG/ACT Inhalation Aerosol Powder, Breath Activated Inhale 1 puff into the lungs daily. (Patient not taking: Reported on 10/23/2024)         Review of Systems: Review of Systems    Physical Exam:  /84 (BP Location: Right arm, Patient Position: Sitting, Cuff Size: adult)   Pulse 68   Resp (!) 6   Ht 5' 2\" (1.575 m)   Wt 178 lb (80.7 kg)   LMP  (LMP Unknown)   SpO2 97%   BMI 32.56 kg/m²      Constitutional: alert, cooperative. No acute distress.  HEENT: Head NC/AT. Nares normal. Septum midline. Mucosa normal. No drainage or sinus tenderness.. Mallampati 2+  Cardio: Regular rate and rhythm. Normal S1 and S2. No murmurs.   Respiratory: Thorax symmetrical with no labored breathing. clear to auscultation bilaterally  GI: NABS. Abd soft, non-tender.  Extremities: No clubbing or cyanosis. No BLE edema.    Neurologic: A&Ox3. No gross motor deficits.  Skin: Warm, dry  Psych: Calm, cooperative. Pleasant affect.    Results:  Personally reviewed  WBC: 5.9, done on 5/15/2024.  HGB: 15.1, done on 5/15/2024.  PLT: 269, done on 5/15/2024.     Glucose: 86, done on 5/15/2024.  Cr: 0.64, done on 5/15/2024.  Last eGFR was 103 on 5/15/2024.  CA: 9.6, done on 5/15/2024.  Na: 140, done on 5/15/2024.  K: 3.8, done on 5/15/2024.  Cl: 107, done on 5/15/2024.  CO2: 26, done on 5/15/2024.  Last ALB was 4.6% done on 5/15/2024.     Transvaginal US GYNE Only  [[id number]]    Result Date: 10/8/2024  Left Ovary Length 2.21 cm Width 1.25 cm Height 1.20 cm Volume 1.736 cm³ Right Ovary Length 2.12 cm Width 1.81 cm Height 1.01 cm Volume 2.029 cm³ Uterus Length 9.15 cm Width 5.16 cm Height 4.03 cm Multifibroid uterus. Intramural fibroid posterior/fundal measuring 4.53 x 4.10 x 4.48 cm. Intramural fibroid in the lower uterine segment measuring 2.05 x 1.72 x 1.86 cm. Fundal pedunculated fibroid measuring 4.53 x 3.95 x 4.18 cm. Anterior subserosal fibroid measuring 1.75 x 1.39 x 1.80 cm. Normal appearing ovaries bilaterally. Yuri Puente MD     CT CHEST (ASX=54149)    Result Date: 9/28/2024  CONCLUSION:   1. Mild patchy nonspecific ground-glass opacity in the right middle lobe may represent area of scarring, nonspecific pneumonitis, with other etiologies not entirely excluded.  Clinical correlation recommended.   2. Noncalcified pulmonary nodules in the lungs measuring up to 5 mm as above. Followup as per Fleischner criteria is suggested.  3. Cholelithiasis.  4. Small hiatal hernia.  Fluid in the esophagus may be due to gastroesophageal reflux.  Clinical correlation recommended.  Please see above for further details.   The findings include multiple, incidentally detected, solid pulmonary nodules, measuring less than 6mm.  2017 guidelines from the Fleischner Society for the follow-up and management of incidentally detected indeterminate pulmonary nodules in persons at least 35 years of age depend on nodule size (average length and width) and underlying risk factors (including smoking and other risk factors). Please consider the following recommendations after clinical assessment of risk factors.  For <6mm solid nodules: In low risk patients, no follow-up required.  If suspicious morphology or upper lobe location, consider 12 month follow-up.  In high risk patients, optional CT in 12 months.   LOCATION:  [id number]   Dictated by (CST): Stan Sepulveda MD on 9/28/2024 at 2:57 PM      Finalized by (CST): Stan Sepulveda MD on 9/28/2024 at 3:00 PM         Assessment/Plan:  #1. 4 and 5 mm lung nodule, low risk   We reviewed CT scan together, nodules are benign in appearance  Per guidelines would not require follow up imaging    #2. Chronic cough likely Allergic rhinosinusitis  Trial of navage or other nasal rinsing system and then flonase afterwards       Return in about 6 weeks (around 12/4/2024).    Rachael Motta MD  10/23/2024

## 2024-10-23 NOTE — PATIENT INSTRUCTIONS
Start using nasal rinses (navage, neilmed rinse) with distilled water at least once a day but can use twice a day if needed. These can be found at your local pharmacy or [employer].  After using the nasal rinse, then use a nasal spray atrovent. Use this for at least two weeks to assess for any improvement.

## 2024-10-24 ENCOUNTER — HOSPITAL ENCOUNTER (OUTPATIENT)
Dept: MAMMOGRAPHY | Age: 59
Discharge: HOME | End: 2024-10-24
Attending: STUDENT IN AN ORGANIZED HEALTH CARE EDUCATION/TRAINING PROGRAM
Payer: COMMERCIAL

## 2024-10-24 ENCOUNTER — HOSPITAL ENCOUNTER (OUTPATIENT)
Dept: ULTRASOUND IMAGING | Age: 59
Discharge: HOME | End: 2024-10-24
Attending: STUDENT IN AN ORGANIZED HEALTH CARE EDUCATION/TRAINING PROGRAM
Payer: COMMERCIAL

## 2024-10-24 DIAGNOSIS — N63.0 MASS OF BREAST, UNSPECIFIED LATERALITY: ICD-10-CM

## 2024-10-24 PROCEDURE — 77066 DX MAMMO INCL CAD BI: CPT | Performed by: STUDENT IN AN ORGANIZED HEALTH CARE EDUCATION/TRAINING PROGRAM

## 2024-10-24 PROCEDURE — 77062 BREAST TOMOSYNTHESIS BI: CPT | Performed by: STUDENT IN AN ORGANIZED HEALTH CARE EDUCATION/TRAINING PROGRAM

## 2024-10-24 PROCEDURE — 76642 ULTRASOUND BREAST LIMITED: CPT | Performed by: STUDENT IN AN ORGANIZED HEALTH CARE EDUCATION/TRAINING PROGRAM

## 2024-11-01 ENCOUNTER — TELEPHONE (OUTPATIENT)
Facility: CLINIC | Age: 59
End: 2024-11-01

## 2024-11-01 RX ORDER — IPRATROPIUM BROMIDE 42 UG/1
1 SPRAY, METERED NASAL NIGHTLY
Qty: 3 EACH | Refills: 1 | Status: SHIPPED | OUTPATIENT
Start: 2024-11-01

## 2024-11-01 NOTE — TELEPHONE ENCOUNTER
Received request for 90 day supply for Ipratropium nasal spray.  Patient last seen on 10/23/24. Per Dr. Kalia ocasio to give a 90 day supply.

## 2024-11-04 NOTE — CONSULTS
Breast Surgery New Patient Consultation    Raiza Castro is a 59 year old patient, referred by Dr. Jin, who presents for evaluation of right chest lipoma.    History of Present Illness:   Ms. Raiza Castro is a 59 year old woman with a past history significant for hypertension who presents for evaluation of right chest lipoma.     She underwent diagnostic mammogram on 10/24/2024.  This showed density C breast tissue.  She has an enlarging palpable mass in the upper central right breast.  At the 12 o'clock position there was a mass consistent with a lipoma measuring 5.5 x 2.0 x 18.5 cm.  This previously measured 6.4 x 4.5 x 2.0 cm.  Of note in the left breast she had numerous cysts that had increased in size.  The largest measuring 3.5 cm.    She first noticed the mass when breast-feeding about 20 years ago.  It started out small and at the clavicle and over time has grown towards her breast.  She had seen a specialist at that time that recommended observation.  She thinks it is grown slowly, but is unsure.    She denies any nipple discharge, skin changes, or axillary adenopathy. She does not have breast pain. She does not have significant past history for breast diseases or breast biopsy. She does not have family history of breast cancer. She does not have a history of genetic testing.          Past Medical History:    Chronic cough    Essential hypertension    Hypertension       History reviewed. No pertinent surgical history.    Gynecological History:  Menarche at age 13 and LMP n/a  Pt is a   Pt was 31 years old at time of first pregnancy.    She has cumulative breastfeeding history of 6 months.  Age of Menopause: 51  Type: natural  She denies any history of hormone replacement therapy   She has history of oral contraceptive use in the past.   She denies infertility treatment to achieve pregnancy.    Medications:    No outpatient medications have been marked as taking for the 24  encounter (Office Visit) with Juan R Roldan MD.       Allergies:    Allergies[1]    Family History:   Family History   Problem Relation Age of Onset    Prostate Cancer Father 81    Hypertension Mother     Diabetes Mother     Blood Cancer Brother 61       She is not of Ashkenazi Confucianism ancestry.    Social History:  History   Alcohol Use Never       History   Smoking Status    Never   Smokeless Tobacco    Not on file       Review of Systems:    Review of Systems   Constitutional:  Negative for activity change, appetite change, chills, fatigue and unexpected weight change.   HENT:  Negative for ear pain, hearing loss, nosebleeds, sore throat, trouble swallowing and voice change.    Eyes:  Negative for pain and visual disturbance.   Respiratory:  Negative for cough, chest tightness and shortness of breath.    Cardiovascular:  Negative for chest pain, palpitations and leg swelling.   Gastrointestinal:  Negative for nausea, vomiting, abdominal pain, diarrhea, constipation and blood in stool.   Endocrine: Negative for cold intolerance and heat intolerance.   Genitourinary:  Negative for dysuria, hematuria and difficulty urinating.   Musculoskeletal:  Negative for back pain, joint swelling, joint pain and neck pain.   Skin:  Negative for color change, rash and wound.   Allergic/Immunologic: Negative for environmental allergies.   Neurological:  Negative for tremors, syncope, facial asymmetry, speech difficulty and weakness.   Hematological:  Negative for adenopathy. Does not bruise/bleed easily.   Psychiatric/Behavioral:  Negative for hallucinations, behavioral problems, confusion, agitation and depressed mood.       Otherwise as per HPI.    Physical Exam:    /81   Pulse 76   Temp 98.4 °F (36.9 °C) (Tympanic)   Resp 16   Wt 81.9 kg (180 lb 8 oz)   LMP  (LMP Unknown)   SpO2 93%   BMI 33.01 kg/m²     Physical Exam  Vitals reviewed.   Constitutional:       Appearance: Normal appearance.   HENT:      Head:  Normocephalic and atraumatic.   Eyes:      Extraocular Movements: Extraocular movements intact.      Pupils: Pupils are equal, round, and reactive to light.   Cardiovascular:      Rate and Rhythm: Normal rate.   Pulmonary:      Effort: Pulmonary effort is normal.   Chest:      Chest wall: Mass present.   Breasts:     Right: Normal. No mass, nipple discharge, skin change or tenderness.      Left: Normal. No mass, nipple discharge, skin change or tenderness.          Comments: Soft, mostly mobile soft tissue mass, right anterior chest  Measuring about 6 x 18 cm  No skin changes, not tender  Abdominal:      General: Abdomen is flat.      Palpations: Abdomen is soft.   Musculoskeletal:         General: Normal range of motion.      Cervical back: Normal range of motion and neck supple.   Lymphadenopathy:      Upper Body:      Right upper body: No supraclavicular or axillary adenopathy.      Left upper body: No supraclavicular or axillary adenopathy.   Skin:     General: Skin is warm and dry.   Neurological:      General: No focal deficit present.      Mental Status: She is alert and oriented to person, place, and time.   Psychiatric:         Mood and Affect: Mood normal.          Bra size: 36D (L)    Labs/imaging: reviewed in EPIC    Impression:   Ms. Raiza Castro is a 59 year old woman that presents for evaluation of right chest lipoma    Discussion and Plan:  I had a discussion with the Patient regarding her breast exam. On exam today I was able to palpate her area of concern.  The mass measured about 6 x 18 cm.  I personally reviewed her recent imaging and we discussed this.    We discussed that lipoma is usually a benign mass.  They can grow over time, but I discussed that I am concerned if it has grown from 6 cm to 18 cm in the span of about a year.  This large mass could harbor a soft tissue malignancy.  I have ordered an MRI of the chest to evaluate the region.  We also discussed possible biopsy.  Will  her after MRI results.    She was given ample opportunity for questions and those questions were answered to her satisfaction. She has been encouraged to contact the office with any questions or concerns. This encounter lasted a total of 50 minutes, more than 50% of which was dedicated to the discussion of management options.     Juan R Roldan MD  Breast Surgical Oncology    CC: Dr. Jin            [1] No Known Allergies

## 2024-11-06 ENCOUNTER — OFFICE VISIT (OUTPATIENT)
Facility: CLINIC | Age: 59
End: 2024-11-06
Payer: COMMERCIAL

## 2024-11-06 VITALS
WEIGHT: 180.5 LBS | SYSTOLIC BLOOD PRESSURE: 127 MMHG | OXYGEN SATURATION: 93 % | DIASTOLIC BLOOD PRESSURE: 81 MMHG | HEART RATE: 76 BPM | TEMPERATURE: 98 F | RESPIRATION RATE: 16 BRPM | BODY MASS INDEX: 33 KG/M2

## 2024-11-06 DIAGNOSIS — D17.1 LIPOMA OF ANTERIOR CHEST WALL: Primary | ICD-10-CM

## 2024-11-06 PROCEDURE — 3079F DIAST BP 80-89 MM HG: CPT | Performed by: SURGERY

## 2024-11-06 PROCEDURE — 3074F SYST BP LT 130 MM HG: CPT | Performed by: SURGERY

## 2024-11-06 PROCEDURE — 99245 OFF/OP CONSLTJ NEW/EST HI 55: CPT | Performed by: SURGERY

## 2024-12-04 ENCOUNTER — HOSPITAL ENCOUNTER (OUTPATIENT)
Dept: MRI IMAGING | Age: 59
Discharge: HOME | End: 2024-12-04
Attending: SURGERY
Payer: COMMERCIAL

## 2024-12-04 DIAGNOSIS — D17.1 LIPOMA OF ANTERIOR CHEST WALL: ICD-10-CM

## 2024-12-05 DIAGNOSIS — D17.1 LIPOMA OF ANTERIOR CHEST WALL: Primary | ICD-10-CM

## 2024-12-05 NOTE — PROGRESS NOTES
Patient could not undergo MRI due to claustrophobia. Discussed with radiology that CT chest with IV contrast could be appropriate in order to rule out any malignant degeneration/sarcoma. Important to get imaging in case there is a suspicious area to biopsy for our surgical plan. My team will update patient.    Juan R Roldan MD  Breast Surgical Oncology

## 2024-12-09 ENCOUNTER — TELEPHONE (OUTPATIENT)
Dept: SURGERY | Facility: CLINIC | Age: 59
End: 2024-12-09

## 2024-12-09 DIAGNOSIS — D17.1 LIPOMA OF ANTERIOR CHEST WALL: Primary | ICD-10-CM

## 2024-12-09 NOTE — TELEPHONE ENCOUNTER
Discussed with patient over the phone that she is uncomfortable undergoing IV contrast for the CT scan.  We discussed that the reason for the preliminary testing was to potentially identify any areas of malignant transformation or suspicion.  She is comfortable with proceeding with surgery, knowing that we may have additional procedures or treatments necessary if there are any suspicious areas on final pathology testing.  We discussed surgery, the risks and benefits, the outpatient nature.  She knows she will have lifting restrictions, we discussed return to work.  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  We discussed the risks of infection and bleeding, they are low.  She will hear from our schedulers soon.  Plan: Right anterior chest excisional biopsy.    Juan R Roldan MD  Breast Surgical Oncology

## 2024-12-09 NOTE — TELEPHONE ENCOUNTER
Calling pt in regards to scheduling surgery.  Informed pt that I have 01/03/2025 available at [facility] with Dr. Roldan.  Pt verbalized understanding and in agreement with date and location.  All questions answered.   Encouraged pt to call or Sp[employer] message office with any other questions or concerns.

## 2024-12-21 ENCOUNTER — HOSPITAL ENCOUNTER (EMERGENCY)
Age: 59
Discharge: HOME | End: 2024-12-21
Attending: EMERGENCY MEDICINE
Payer: COMMERCIAL

## 2024-12-21 ENCOUNTER — APPOINTMENT (OUTPATIENT)
Dept: GENERAL RADIOLOGY | Age: 59
End: 2024-12-21
Attending: EMERGENCY MEDICINE
Payer: COMMERCIAL

## 2024-12-21 VITALS
TEMPERATURE: 98 F | SYSTOLIC BLOOD PRESSURE: 125 MMHG | DIASTOLIC BLOOD PRESSURE: 64 MMHG | HEART RATE: 80 BPM | OXYGEN SATURATION: 99 % | RESPIRATION RATE: 16 BRPM

## 2024-12-21 DIAGNOSIS — R07.9 ACUTE CHEST PAIN: Primary | ICD-10-CM

## 2024-12-21 LAB
ALBUMIN SERPL-MCNC: 4.7 G/DL (ref 3.2–4.8)
ALBUMIN/GLOB SERPL: 1.6 {RATIO} (ref 1–2)
ALP LIVER SERPL-CCNC: 109 U/L
ALT SERPL-CCNC: 26 U/L
ANION GAP SERPL CALC-SCNC: 6 MMOL/L (ref 0–18)
AST SERPL-CCNC: 28 U/L (ref ?–34)
BASOPHILS # BLD AUTO: 0.1 X10(3) UL (ref 0–0.2)
BASOPHILS NFR BLD AUTO: 1.2 %
BILIRUB SERPL-MCNC: 0.5 MG/DL (ref 0.3–1.2)
BUN BLD-MCNC: 16 MG/DL (ref 9–23)
CALCIUM BLD-MCNC: 9.9 MG/DL (ref 8.7–10.4)
CHLORIDE SERPL-SCNC: 108 MMOL/L (ref 98–112)
CO2 SERPL-SCNC: 27 MMOL/L (ref 21–32)
CREAT BLD-MCNC: 0.64 MG/DL
EGFRCR SERPLBLD CKD-EPI 2021: 103 ML/MIN/1.73M2 (ref 60–?)
EOSINOPHIL # BLD AUTO: 0.16 X10(3) UL (ref 0–0.7)
EOSINOPHIL NFR BLD AUTO: 1.9 %
ERYTHROCYTE [DISTWIDTH] IN BLOOD BY AUTOMATED COUNT: 14.7 %
GLOBULIN PLAS-MCNC: 2.9 G/DL (ref 2–3.5)
GLUCOSE BLD-MCNC: 100 MG/DL (ref 70–99)
HCT VFR BLD AUTO: 43.5 %
HGB BLD-MCNC: 14.5 G/DL
IMM GRANULOCYTES # BLD AUTO: 0.02 X10(3) UL (ref 0–1)
IMM GRANULOCYTES NFR BLD: 0.2 %
LYMPHOCYTES # BLD AUTO: 2.19 X10(3) UL (ref 1–4)
LYMPHOCYTES NFR BLD AUTO: 26.3 %
MCH RBC QN AUTO: 29.6 PG (ref 26–34)
MCHC RBC AUTO-ENTMCNC: 33.3 G/DL (ref 31–37)
MCV RBC AUTO: 88.8 FL
MONOCYTES # BLD AUTO: 0.81 X10(3) UL (ref 0.1–1)
MONOCYTES NFR BLD AUTO: 9.7 %
NEUTROPHILS # BLD AUTO: 5.04 X10 (3) UL (ref 1.5–7.7)
NEUTROPHILS # BLD AUTO: 5.04 X10(3) UL (ref 1.5–7.7)
NEUTROPHILS NFR BLD AUTO: 60.7 %
OSMOLALITY SERPL CALC.SUM OF ELEC: 293 MOSM/KG (ref 275–295)
PLATELET # BLD AUTO: 241 10(3)UL (ref 150–450)
POTASSIUM SERPL-SCNC: 3.8 MMOL/L (ref 3.5–5.1)
PROT SERPL-MCNC: 7.6 G/DL (ref 5.7–8.2)
RBC # BLD AUTO: 4.9 X10(6)UL
SODIUM SERPL-SCNC: 141 MMOL/L (ref 136–145)
TROPONIN I SERPL HS-MCNC: 3 NG/L
TROPONIN I SERPL HS-MCNC: 3 NG/L
WBC # BLD AUTO: 8.3 X10(3) UL (ref 4–11)

## 2024-12-21 PROCEDURE — 85025 COMPLETE CBC W/AUTO DIFF WBC: CPT | Performed by: EMERGENCY MEDICINE

## 2024-12-21 PROCEDURE — 36415 COLL VENOUS BLD VENIPUNCTURE: CPT

## 2024-12-21 PROCEDURE — 93005 ELECTROCARDIOGRAM TRACING: CPT

## 2024-12-21 PROCEDURE — 71045 X-RAY EXAM CHEST 1 VIEW: CPT | Performed by: EMERGENCY MEDICINE

## 2024-12-21 PROCEDURE — 99285 EMERGENCY DEPT VISIT HI MDM: CPT

## 2024-12-21 PROCEDURE — 84484 ASSAY OF TROPONIN QUANT: CPT | Performed by: EMERGENCY MEDICINE

## 2024-12-21 PROCEDURE — 93010 ELECTROCARDIOGRAM REPORT: CPT

## 2024-12-21 PROCEDURE — 99284 EMERGENCY DEPT VISIT MOD MDM: CPT

## 2024-12-21 PROCEDURE — 80053 COMPREHEN METABOLIC PANEL: CPT | Performed by: EMERGENCY MEDICINE

## 2024-12-21 RX ORDER — ASPIRIN 81 MG/1
324 TABLET, CHEWABLE ORAL ONCE
Status: COMPLETED | OUTPATIENT
Start: 2024-12-21 | End: 2024-12-21

## 2024-12-22 LAB
ATRIAL RATE: 58 BPM
P AXIS: 46 DEGREES
P-R INTERVAL: 170 MS
Q-T INTERVAL: 452 MS
QRS DURATION: 94 MS
QTC CALCULATION (BEZET): 443 MS
R AXIS: -6 DEGREES
T AXIS: 32 DEGREES
VENTRICULAR RATE: 58 BPM

## 2024-12-22 NOTE — ED PROVIDER NOTES
Patient Seen in: [facility] In [place]      History     Chief Complaint   Patient presents with    Chest Pain Angina     Stated Complaint: Pt c/o exertional chest pain that started two hours ago at work. She works in a*    Subjective:   HPI      This is a 59-year-old female past medical history of hypertension, asthma who presents with exertional chest pain that began 2 hours ago while she was at work.  She does work in a factory.  Patient describes the pain as being left-sided.  About the size of a golf ball.  It is nonradiating.  It started at 6 PM tonight and was constant and has gradually faded.  It is now 1/10.  There is no associated nausea or vomiting.  No diaphoresis.  No fevers chills cough or cold symptoms.  She states that she occasionally coughs due to her asthma but it is nonproductive.  She also reports that she had the same symptoms on Thursday and it lasted about the same amount of time.  She took ibuprofen then it seemed to get better.  It is not made worse by deep breathing or movement.  She presents here for tonight because when she got home from work it was still bothering her.  Her son is at bedside.  She does not smoke tobacco.  She does not drink alcohol.  She did not use illicit drugs.  She has no significant family history of coronary disease she has no history of coronary artery disease.  She presents here for further evaluation.              Objective:     Past Medical History:    Chronic cough    Essential hypertension    Hypertension              History reviewed. No pertinent surgical history.             Social History     Socioeconomic History    Marital status:    Tobacco Use    Smoking status: Never     Passive exposure: Past (parents)   Vaping Use    Vaping status: Never Used   Substance and Sexual Activity    Alcohol use: Never    Drug use: Never                  Physical Exam     ED Triage Vitals [12/21/24 2053]   /85   Pulse 68   Resp 16    Temp 97.7 °F (36.5 °C)   Temp src    SpO2 100 %   O2 Device None (Room air)       Current Vitals:   Vital Signs  BP: 148/79  Pulse: 70  Resp: 18  Temp: 97.7 °F (36.5 °C)    Oxygen Therapy  SpO2: 100 %  O2 Device: None (Room air)        Physical Exam  GENERAL: Awake, alert oriented x3, nontoxic appearing.   SKIN: Normal, warm, and dry.  HEENT:  Pupils equally round and reactive to light. Conjuctiva clear.  Oropharynx is clear and moist.   Lungs: Clear to auscultation bilaterally with no rales, no retractions, and no wheezing.  HEART:  Regular rate and rhythm. S1 and S2. No murmurs, no rubs or gallops.   ABDOMEN: Soft, nontender and nondistended. Normoactive bowel sounds. No rebound. No guarding.   EXTREMITIES: Warm with brisk capillary refill.         ED Course     Labs Reviewed   COMP METABOLIC PANEL (14) - Abnormal; Notable for the following components:       Result Value    Glucose 100 (*)     All other components within normal limits   TROPONIN I HIGH SENSITIVITY - Normal   TROPONIN I HIGH SENSITIVITY - Normal   CBC WITH DIFFERENTIAL WITH PLATELET   RAINBOW DRAW BLUE     EKG    Rate, intervals and axes as noted on EKG Report.  Rate: 58  Rhythm: Sinus bradycardia  Reading: No acute changes            XR CHEST AP PORTABLE  (CPT=71045)    Result Date: 12/21/2024            PROCEDURE:  XR CHEST AP PORTABLE  (CPT=71045)  TECHNIQUE:  AP chest radiograph was obtained.  COMPARISON:  RENATA PAUL, XR CHEST PA + LAT CHEST (ERA=[id number]), 4/05/2024, 9:56 AM.  INDICATIONS:  Pt c/o exertional chest pain that started two hours ago at work. She works in a factory.  PATIENT STATED HISTORY: (As transcribed by Technologist)  Sudden onset chest pain today. Has occurred intermittently for several months. Pain subsided with Motrin   FINDINGS: Cardiac silhouette and pulmonary vasculature are unremarkable. No consolidation, pleural effusion or pneumothorax. IMPRESSION: Unremarkable portable chest radiograph.   LOCATION:  [facility]       Dictated by (CST): Mehrdad Altamirano MD on 12/21/2024 at 9:37 PM     Finalized by (CST): Mehrdad Altamirano MD on 12/21/2024 at 9:38 PM              MDM      This is a 59-year-old female past medical history of hypertension, asthma who presents with exertional chest pain that began 2 hours ago while she was at work.  She does work in a factory.  Patient describes the pain as being left-sided.  About the size of a golf ball.  It is nonradiating.  It started at 6 PM tonight and was constant and has gradually faded.  It is now 1/10.  Differential includes acute coronary syndrome, pleuritic chest pain, muscle skeletal.    Patient placed on cardiac monitor, continuous pulse oximetry and IV line was established of normal saline.  Patient was given 4 baby aspirin.    Basic labs were obtained.  CBC: White blood cell count 8.3.  Hemoglobin 14.5.  Platelet 241.  CMP: BUN 16.  Creatinine 0.6.  Glucose 100.  Bicarb 27.  Troponin was negative    Repeat troponin: Negative      I independently reviewed the chest x-ray which showed no evidence of pneumonia or cardiomegaly or consolidation.  I also read the radiology interpretation and agreement.      10 PM I reassessed patient.  She states she feels a lot better.  She has been symptom-free.      Heart score 2 low risk       Would recommend patient take a baby aspirin a day, she may try applying warm compress to chest wall or taking ibuprofen additionally for any more discomfort.  Recommend close outpatient follow-up with primary care.  May benefit from further cardiac evaluation.  She is comfortable with this plan.  She was discharged home in good condition with     Disposition and Plan     Clinical Impression:  1. Acute chest pain         Disposition:  Discharge  12/21/2024 11:14 pm    Follow-up:  Alana Downs  N[address] [id number] [phone]    Follow up on 12/23/2024            Medications Prescribed:  Current Discharge Medication  List              Supplementary Documentation:

## 2024-12-22 NOTE — DISCHARGE INSTRUCTIONS
Take a baby aspirin daily  Try applying warm compresses to chest wall 20 minutes every 2 hours while awake.  Avoid heating pad can burn skin.  Ibuprofen for 100 mg 3 times a day with food for 3 days  Return if worse  Recheck with primary care physician on Monday.  He may need further cardiac evaluation as an outpatient.

## 2024-12-22 NOTE — ED INITIAL ASSESSMENT (HPI)
Pt states was at work today and had sudden onset of chest pain. Denies n/v, lightheaded or dizziness, SOB or other symptoms. Pt states this happened for the first time 2 months ago and happened again Thursday. Those time pt took motrin and pain subsided.

## 2024-12-23 ENCOUNTER — TELEPHONE (OUTPATIENT)
Dept: INTERNAL MEDICINE CLINIC | Facility: CLINIC | Age: 59
End: 2024-12-23

## 2024-12-23 ENCOUNTER — TELEPHONE (OUTPATIENT)
Dept: SURGERY | Facility: CLINIC | Age: 59
End: 2024-12-23

## 2024-12-23 DIAGNOSIS — D17.1 LIPOMA OF ANTERIOR CHEST WALL: Primary | ICD-10-CM

## 2024-12-23 DIAGNOSIS — R07.89 ATYPICAL CHEST PAIN: Primary | ICD-10-CM

## 2024-12-23 DIAGNOSIS — Z01.818 PREOP TESTING: ICD-10-CM

## 2024-12-23 NOTE — TELEPHONE ENCOUNTER
Can double book for 12/31 at 1140. But should complete stress test before that. Order placed.   Should arrive early.

## 2024-12-23 NOTE — TELEPHONE ENCOUNTER
Patient requesting preop clearance appointment for Right Anterior chest excisional biopsy on 1/3/24    Ok for patient to be seen by alternate provider or can PCP accommodate  request for appointment.

## 2024-12-23 NOTE — TELEPHONE ENCOUNTER
Called patient and left voicemail to call us to reschedule her surgery with Dr. Roldan to 01/17/2025

## 2024-12-23 NOTE — TELEPHONE ENCOUNTER
Patient called requesting a Pre Op clearance appt before 01/03/2024 . No available appt before,  Can any MD over book Patient for Pre Op Clearance ?

## 2024-12-24 ENCOUNTER — HOSPITAL ENCOUNTER (OUTPATIENT)
Dept: CV DIAGNOSTICS | Facility: HOSPITAL | Age: 59
Discharge: HOME | End: 2024-12-24
Attending: INTERNAL MEDICINE
Payer: COMMERCIAL

## 2024-12-24 DIAGNOSIS — R07.89 ATYPICAL CHEST PAIN: ICD-10-CM

## 2024-12-24 DIAGNOSIS — Z01.818 PREOP TESTING: ICD-10-CM

## 2024-12-24 PROCEDURE — 93018 CV STRESS TEST I&R ONLY: CPT | Performed by: INTERNAL MEDICINE

## 2024-12-24 PROCEDURE — 93017 CV STRESS TEST TRACING ONLY: CPT | Performed by: INTERNAL MEDICINE

## 2024-12-26 NOTE — TELEPHONE ENCOUNTER
Attempted multiple times on different phones to reach patient to schedule appt and phone would not dial out to number listed for patient.

## 2024-12-30 NOTE — TELEPHONE ENCOUNTER
Future Appointments   Date Time Provider Department Center   12/31/2024 11:40 AM Alana Downs MD [facility] [place]   1/14/2025  8:45 AM Juan R Roldan MD [facility]

## 2024-12-30 NOTE — TELEPHONE ENCOUNTER
PSRs- Can we please double book patient for 12/31 at 11:40 am with Dr. Sue for pre op clearance. TY!

## 2024-12-31 ENCOUNTER — OFFICE VISIT (OUTPATIENT)
Dept: INTERNAL MEDICINE CLINIC | Facility: CLINIC | Age: 59
End: 2024-12-31
Payer: COMMERCIAL

## 2024-12-31 VITALS
OXYGEN SATURATION: 98 % | WEIGHT: 170.38 LBS | DIASTOLIC BLOOD PRESSURE: 68 MMHG | HEART RATE: 76 BPM | TEMPERATURE: 98 F | SYSTOLIC BLOOD PRESSURE: 128 MMHG | BODY MASS INDEX: 31 KG/M2

## 2024-12-31 DIAGNOSIS — F41.9 ANXIETY: ICD-10-CM

## 2024-12-31 DIAGNOSIS — F40.240 CLAUSTROPHOBIA: ICD-10-CM

## 2024-12-31 DIAGNOSIS — R22.2 CHEST MASS: Primary | ICD-10-CM

## 2024-12-31 PROCEDURE — 99214 OFFICE O/P EST MOD 30 MIN: CPT | Performed by: INTERNAL MEDICINE

## 2024-12-31 PROCEDURE — 3078F DIAST BP <80 MM HG: CPT | Performed by: INTERNAL MEDICINE

## 2024-12-31 PROCEDURE — 3074F SYST BP LT 130 MM HG: CPT | Performed by: INTERNAL MEDICINE

## 2024-12-31 RX ORDER — CLONAZEPAM 0.5 MG/1
0.5 TABLET ORAL AS DIRECTED
Qty: 2 TABLET | Refills: 0 | Status: SHIPPED | OUTPATIENT
Start: 2024-12-31

## 2024-12-31 NOTE — PROGRESS NOTES
Subjective:   Raiza Castro is a 59 year old female  who presents for Other     Pt changed her mind about surgery. Today she is here because she wants a second opinion.   She is very anxious     Discussed with patient that should complete the MRI ordered by Dr. Roldan would help with getting further details but pt states she gets claustrophobic with MRIs. Discussed medication to help with that and how to take and have a .     She states she has had the mass for years. Started closer to her shoulder and has grown.     Pt reports wanting an opinion from a general surgeon. Referral placed.     Pt is very anxious regarding this.   History/Other:    Chief Complaint Reviewed and Verified  No Further Nursing Notes to   Review  Tobacco Reviewed  Allergies Reviewed  Medications Reviewed  OB   Status Reviewed         Current Outpatient Medications   Medication Sig Dispense Refill    clonazePAM (KLONOPIN) 0.5 MG Oral Tab Take 1 tablet (0.5 mg total) by mouth As Directed. Take one tablet 30 minutes prior to MRI. If after 30 minutes you still feel anxious then you can take one more tablet. 2 tablet 0    Albuterol-Budesonide 90-80 MCG/ACT Inhalation Aerosol Inhale into the lungs as needed. Airsupra      amLODIPine 10 MG Oral Tab Take 1 tablet (10 mg total) by mouth daily. 90 tablet 3    montelukast 10 MG Oral Tab Take 1 tablet (10 mg total) by mouth nightly. 90 tablet 3       Review of Systems:  Pertinent items are noted in HPI.  A comprehensive 10 point review of systems was completed.  Pertinent positives and negatives noted in the the HPI.        Objective:   /68 (BP Location: Left arm, Patient Position: Sitting, Cuff Size: adult)   Pulse 76   Temp 98.1 °F (36.7 °C) (Temporal)   Wt 170 lb 6.4 oz (77.3 kg)   LMP  (LMP Unknown)   SpO2 98%   BMI 31.17 kg/m²  Estimated body mass index is 31.17 kg/m² as calculated from the following:    Height as of 12/30/24: 5' 2\" (1.575 m).    Weight as of this  encounter: 170 lb 6.4 oz (77.3 kg).  PHYSICAL EXAM:   General: no acute distress   Respiratory: no increased work of breathing;   Behavioral/Psych: anxious; appropriate affect   Chest: large soft mass on R chest extending upward    Assessment & Plan:   Raiza was seen today for other.    Diagnoses and all orders for this visit:    Chest mass  Mammogram from 10/2024 showed   Impression   CONCLUSION:  Patient complains of an enlarging palpable mass at the 12 o'clock position right breast extending towards the supraclavicular region.  Targeted ultrasound shows this represents an isoechoic mass consistent with lipoma measuring 5.5 x 2.0 x  18.5 cm.  This has increased in size when compared to prior outside imaging.  Further management of an enlarging lipoma should be based on clinical grounds.  Recommend surgical consultation for management discussion.     Enlarging simple cysts in the upper central left breast.       BI-RADS CATEGORY:    DIAGNOSTIC CATEGORY 3 - PROBABLY BENIGN FINDING.       -     Surgery Referral - In Network  -complete MRI chest     Anxiety with MRI  Claustrophobia  -     clonazePAM (KLONOPIN) 0.5 MG Oral Tab; Take 1 tablet (0.5 mg total) by mouth As Directed. Take one tablet 30 minutes prior to MRI. If after 30 minutes you still feel anxious then you can take one more tablet.              Alana Cade MD

## 2025-01-03 DIAGNOSIS — D17.1 LIPOMA OF ANTERIOR CHEST WALL: Primary | ICD-10-CM

## 2025-01-27 ENCOUNTER — HOSPITAL ENCOUNTER (OUTPATIENT)
Dept: MRI IMAGING | Facility: HOSPITAL | Age: 60
Discharge: HOME | End: 2025-01-27
Attending: SURGERY
Payer: COMMERCIAL

## 2025-01-27 DIAGNOSIS — D17.1 LIPOMA OF ANTERIOR CHEST WALL: ICD-10-CM

## 2025-01-27 PROCEDURE — A9575 INJ GADOTERATE MEGLUMI 0.1ML: HCPCS | Performed by: SURGERY

## 2025-01-27 PROCEDURE — 71552 MRI CHEST W/O & W/DYE: CPT | Performed by: SURGERY

## 2025-01-27 RX ORDER — GADOTERATE MEGLUMINE 376.9 MG/ML
15 INJECTION INTRAVENOUS
Status: COMPLETED | OUTPATIENT
Start: 2025-01-27 | End: 2025-01-27

## 2025-01-27 RX ADMIN — GADOTERATE MEGLUMINE 15 ML: 376.9 INJECTION INTRAVENOUS at 15:17:00

## 2025-01-29 ENCOUNTER — TELEPHONE (OUTPATIENT)
Dept: OBGYN CLINIC | Facility: CLINIC | Age: 60
End: 2025-01-29

## 2025-01-29 NOTE — TELEPHONE ENCOUNTER
Received fax from [employer] in [place] office visit notes. Placed in Dr Puente's bin in [place] to be reviewed. Thank you

## 2025-01-29 NOTE — PROGRESS NOTES
Left voicemail for patient to call back and discuss results. MRI shows mass to be benign. Noted she is meeting with general surgeon as well.

## 2025-03-07 ENCOUNTER — MED REC SCAN ONLY (OUTPATIENT)
Dept: OBGYN CLINIC | Facility: CLINIC | Age: 60
End: 2025-03-07

## 2025-06-21 DIAGNOSIS — I10 HYPERTENSION, UNSPECIFIED TYPE: ICD-10-CM

## 2025-06-23 RX ORDER — AMLODIPINE BESYLATE 10 MG/1
10 TABLET ORAL DAILY
Qty: 90 TABLET | Refills: 3 | Status: SHIPPED | OUTPATIENT
Start: 2025-06-23

## 2025-06-23 NOTE — TELEPHONE ENCOUNTER
Protocol: Passed  Last Office Visit: 12/31/24  Labs: Completed in December  Last Refill: 6/19/24 #90 3 Refills  Return to Clinic: No future appointments.

## 2025-07-30 DIAGNOSIS — J30.9 ALLERGIC RHINITIS, UNSPECIFIED SEASONALITY, UNSPECIFIED TRIGGER: ICD-10-CM

## 2025-07-30 DIAGNOSIS — J98.9 CHRONIC AIRWAY DISEASE: ICD-10-CM

## 2025-07-30 DIAGNOSIS — R05.3 CHRONIC COUGH: ICD-10-CM

## 2025-07-30 RX ORDER — MONTELUKAST SODIUM 10 MG/1
10 TABLET ORAL NIGHTLY
Qty: 90 TABLET | Refills: 1 | Status: SHIPPED | OUTPATIENT
Start: 2025-07-30

## (undated) NOTE — Clinical Note
Thank you for allowing me to care for your patient! I will order MRI to better evaluate if there is any suspicious component to this lipoma. Ultrasound results imply it has grown very quickly, but patient not totally sure. Thanks, Juan R

## (undated) NOTE — Clinical Note
Just DIOGENES- she is very anxious over this surgery She asked to also see general surgery since it extends further up. I asked her to also follow-up with your office.